# Patient Record
Sex: FEMALE
[De-identification: names, ages, dates, MRNs, and addresses within clinical notes are randomized per-mention and may not be internally consistent; named-entity substitution may affect disease eponyms.]

---

## 2019-07-01 ENCOUNTER — APPOINTMENT (OUTPATIENT)
Dept: OBGYN | Facility: CLINIC | Age: 39
End: 2019-07-01
Payer: COMMERCIAL

## 2019-07-01 ENCOUNTER — RECORD ABSTRACTING (OUTPATIENT)
Age: 39
End: 2019-07-01

## 2019-07-01 VITALS — WEIGHT: 152 LBS | BODY MASS INDEX: 23.04 KG/M2 | HEIGHT: 68 IN

## 2019-07-01 DIAGNOSIS — Z92.89 PERSONAL HISTORY OF OTHER MEDICAL TREATMENT: ICD-10-CM

## 2019-07-01 DIAGNOSIS — M54.5 LOW BACK PAIN: ICD-10-CM

## 2019-07-01 DIAGNOSIS — Z87.898 PERSONAL HISTORY OF OTHER SPECIFIED CONDITIONS: ICD-10-CM

## 2019-07-01 DIAGNOSIS — N83.201 UNSPECIFIED OVARIAN CYST, RIGHT SIDE: ICD-10-CM

## 2019-07-01 DIAGNOSIS — Z87.440 PERSONAL HISTORY OF URINARY (TRACT) INFECTIONS: ICD-10-CM

## 2019-07-01 PROBLEM — Z00.00 ENCOUNTER FOR PREVENTIVE HEALTH EXAMINATION: Status: ACTIVE | Noted: 2019-07-01

## 2019-07-01 LAB
CYTOLOGY CVX/VAG DOC THIN PREP: NORMAL
GLUCOSE UR-MCNC: NORMAL
HGB UR QL STRIP.AUTO: NORMAL
LEUKOCYTE ESTERASE UR QL STRIP: NORMAL

## 2019-07-01 PROCEDURE — 99213 OFFICE O/P EST LOW 20 MIN: CPT

## 2019-07-01 PROCEDURE — 99395 PREV VISIT EST AGE 18-39: CPT | Mod: 25

## 2019-07-01 PROCEDURE — 81002 URINALYSIS NONAUTO W/O SCOPE: CPT

## 2019-07-01 RX ORDER — VALACYCLOVIR HYDROCHLORIDE 500 MG/1
500 TABLET, FILM COATED ORAL
Qty: 60 | Refills: 0 | Status: ACTIVE | COMMUNITY
Start: 2019-01-14

## 2019-07-01 RX ORDER — TOBRAMYCIN AND DEXAMETHASONE 3; 1 MG/ML; MG/ML
0.3-0.1 SUSPENSION/ DROPS OPHTHALMIC
Qty: 5 | Refills: 0 | Status: ACTIVE | COMMUNITY
Start: 2019-02-26

## 2019-07-01 RX ORDER — OFLOXACIN 3 MG/ML
0.3 SOLUTION/ DROPS OPHTHALMIC
Qty: 5 | Refills: 0 | Status: ACTIVE | COMMUNITY
Start: 2019-03-01

## 2019-07-01 RX ORDER — MOXIFLOXACIN OPHTHALMIC 5 MG/ML
0.5 SOLUTION/ DROPS OPHTHALMIC
Qty: 3 | Refills: 0 | Status: ACTIVE | COMMUNITY
Start: 2019-02-25

## 2019-07-01 RX ORDER — SPIRONOLACTONE 100 MG/1
100 TABLET ORAL
Qty: 30 | Refills: 0 | Status: ACTIVE | COMMUNITY
Start: 2019-04-08

## 2019-07-01 RX ORDER — PREDNISOLONE ACETATE 10 MG/ML
1 SUSPENSION/ DROPS OPHTHALMIC
Qty: 15 | Refills: 0 | Status: ACTIVE | COMMUNITY
Start: 2019-03-04

## 2019-07-01 NOTE — PHYSICAL EXAM
[Awake] : awake [Alert] : alert [Soft] : soft [Oriented x3] : oriented to person, place, and time [Normal] : cervix [No Bleeding] : there was no active vaginal bleeding [Uterine Adnexae] : were not tender and not enlarged [Acute Distress] : no acute distress [Mass] : no breast mass [Nipple Discharge] : no nipple discharge [Axillary LAD] : no axillary lymphadenopathy [Tender] : non tender [Enlarged ___ wks] : enlarged [unfilled] ~Uweeks

## 2019-07-12 LAB
CYTOLOGY CVX/VAG DOC THIN PREP: ABNORMAL
HPV DNA HIGH RISK: NORMAL
HPV DNA LOW RISK: NORMAL
HPV I/H RISK 1 DNA CVX QL PROBE+SIG AMP: NORMAL
HPV I/H RISK 1 DNA CVX QL PROBE+SIG AMP: NORMAL
HPV LOW RISK DNA CVX QL PROBE+SIG AMP: NORMAL
HPV LOW RISK DNA CVX QL PROBE+SIG AMP: NORMAL

## 2019-08-26 ENCOUNTER — APPOINTMENT (OUTPATIENT)
Dept: OBGYN | Facility: CLINIC | Age: 39
End: 2019-08-26
Payer: COMMERCIAL

## 2019-08-26 VITALS
HEIGHT: 68 IN | DIASTOLIC BLOOD PRESSURE: 78 MMHG | BODY MASS INDEX: 23.04 KG/M2 | WEIGHT: 152 LBS | SYSTOLIC BLOOD PRESSURE: 106 MMHG

## 2019-08-26 DIAGNOSIS — R39.15 URGENCY OF URINATION: ICD-10-CM

## 2019-08-26 DIAGNOSIS — R31.29 OTHER MICROSCOPIC HEMATURIA: ICD-10-CM

## 2019-08-26 DIAGNOSIS — Z87.440 PERSONAL HISTORY OF URINARY (TRACT) INFECTIONS: ICD-10-CM

## 2019-08-26 PROCEDURE — 99213 OFFICE O/P EST LOW 20 MIN: CPT | Mod: 25

## 2019-08-26 PROCEDURE — 81002 URINALYSIS NONAUTO W/O SCOPE: CPT

## 2019-08-26 PROCEDURE — 76830 TRANSVAGINAL US NON-OB: CPT

## 2019-08-26 RX ORDER — PHENAZOPYRIDINE HYDROCHLORIDE 200 MG/1
200 TABLET ORAL 3 TIMES DAILY
Qty: 6 | Refills: 0 | Status: COMPLETED | COMMUNITY
Start: 2019-08-26 | End: 2019-08-28

## 2019-08-26 RX ORDER — LEVOFLOXACIN 500 MG/1
500 TABLET, FILM COATED ORAL
Qty: 7 | Refills: 0 | Status: COMPLETED | COMMUNITY
Start: 2019-02-28

## 2019-08-26 RX ORDER — CIPROFLOXACIN HYDROCHLORIDE 500 MG/1
500 TABLET, FILM COATED ORAL TWICE DAILY
Qty: 14 | Refills: 0 | Status: COMPLETED | COMMUNITY
Start: 2019-08-26 | End: 2019-09-02

## 2019-08-26 NOTE — PROCEDURE
[Pelvic Pain] : pelvic pain [Transvaginal Ultrasound] : transvaginal ultrasound [Present] : uterus present [L: ___ cm] : L: [unfilled] cm [Anteverted] : anteverted [W: ___cm] : W: [unfilled] cm [FreeTextEntry7] : 2.5 x 4.5 cm [FreeTextEntry8] : 2.5 x 4.0 cm [FreeTextEntry4] : Transvaginal ultrasound appears within normal limits

## 2019-08-27 LAB
GLUCOSE UR-MCNC: NORMAL
HGB UR QL STRIP.AUTO: 250
LEUKOCYTE ESTERASE UR QL STRIP: NORMAL
PROT UR STRIP-MCNC: NORMAL

## 2019-10-03 ENCOUNTER — APPOINTMENT (OUTPATIENT)
Dept: OBGYN | Facility: CLINIC | Age: 39
End: 2019-10-03
Payer: COMMERCIAL

## 2019-10-03 VITALS — SYSTOLIC BLOOD PRESSURE: 108 MMHG | DIASTOLIC BLOOD PRESSURE: 72 MMHG | BODY MASS INDEX: 22.81 KG/M2 | WEIGHT: 150 LBS

## 2019-10-03 DIAGNOSIS — N91.2 AMENORRHEA, UNSPECIFIED: ICD-10-CM

## 2019-10-03 DIAGNOSIS — N92.6 IRREGULAR MENSTRUATION, UNSPECIFIED: ICD-10-CM

## 2019-10-03 DIAGNOSIS — R79.89 OTHER SPECIFIED ABNORMAL FINDINGS OF BLOOD CHEMISTRY: ICD-10-CM

## 2019-10-03 LAB — HCG UR QL: POSITIVE

## 2019-10-03 PROCEDURE — 81025 URINE PREGNANCY TEST: CPT

## 2019-10-03 PROCEDURE — 76830 TRANSVAGINAL US NON-OB: CPT

## 2019-10-03 PROCEDURE — 99214 OFFICE O/P EST MOD 30 MIN: CPT | Mod: 25

## 2019-10-03 NOTE — PROCEDURE
[Amenorrhea] : Amenorrhea [Threatened Miscarriage] : threatened miscarriage [Transvaginal Ultrasound] : transvaginal ultrasound [Anteverted] : anteverted [Present] : uterus present [W: ___cm] : W: [unfilled] cm [L: ___ cm] : L: [unfilled] cm [FreeTextEntry8] : 2.5 x 3.8 cm [FreeTextEntry7] : 2.9 x 4.5 cm [FreeTextEntry4] : Intrauterine pregnancy noted crown-rump length equal to 6 weeks 5 days with fetal heart motion.

## 2019-10-03 NOTE — PHYSICAL EXAM
[Normal] : cervix [Enlarged ___ wks] : enlarged [unfilled] ~Uweeks [No Bleeding] : there was no active vaginal bleeding [Uterine Adnexae] : were not tender and not enlarged

## 2020-12-21 PROBLEM — Z87.440 HISTORY OF URINARY TRACT INFECTION: Status: RESOLVED | Noted: 2019-08-26 | Resolved: 2020-12-21

## 2021-08-05 ENCOUNTER — APPOINTMENT (OUTPATIENT)
Dept: OBGYN | Facility: CLINIC | Age: 41
End: 2021-08-05
Payer: COMMERCIAL

## 2021-08-05 VITALS — BODY MASS INDEX: 22.05 KG/M2 | WEIGHT: 145 LBS | DIASTOLIC BLOOD PRESSURE: 70 MMHG | SYSTOLIC BLOOD PRESSURE: 114 MMHG

## 2021-08-05 DIAGNOSIS — R10.31 RIGHT LOWER QUADRANT PAIN: ICD-10-CM

## 2021-08-05 DIAGNOSIS — Z01.411 ENCOUNTER FOR GYNECOLOGICAL EXAMINATION (GENERAL) (ROUTINE) WITH ABNORMAL FINDINGS: ICD-10-CM

## 2021-08-05 PROCEDURE — 99213 OFFICE O/P EST LOW 20 MIN: CPT | Mod: 25

## 2021-08-05 PROCEDURE — 99395 PREV VISIT EST AGE 18-39: CPT

## 2021-08-05 PROCEDURE — 81002 URINALYSIS NONAUTO W/O SCOPE: CPT

## 2021-08-05 PROCEDURE — 76830 TRANSVAGINAL US NON-OB: CPT

## 2021-08-05 NOTE — HISTORY OF PRESENT ILLNESS
[FreeTextEntry1] : Patient is 40 years old para 4-0-0-4 last menstrual period July 21, 2021\par Patient states that she noted right lower quadrant pain approximately 2 days ago, she is status post evaluation by her primary care physician who ordered a pelvic and abdominal CT scan.  Urine dip is noted to be within normal limits.  Patient states that her menstrual periods are regular every month lasting approximately 5 days.

## 2021-08-05 NOTE — PHYSICAL EXAM
[Appropriately responsive] : appropriately responsive [Alert] : alert [No Acute Distress] : no acute distress [No Lymphadenopathy] : no lymphadenopathy [Regular Rate Rhythm] : regular rate rhythm [No Murmurs] : no murmurs [Clear to Auscultation B/L] : clear to auscultation bilaterally [Soft] : soft [Non-tender] : non-tender [Non-distended] : non-distended [No HSM] : No HSM [No Lesions] : no lesions [No Mass] : no mass [Oriented x3] : oriented x3 [Examination Of The Breasts] : a normal appearance [] : implants [No Masses] : no breast masses were palpable [Labia Majora] : normal [Labia Minora] : normal [Normal] : normal [Enlarged ___ wks] : enlarged [unfilled] ~Uweeks [Uterine Adnexae] : normal [Tenderness] : tender [Anteversion] : anteverted [Mass ___ cm] : a [unfilled] ~Ucm uterine mass was palpated

## 2021-08-05 NOTE — DISCUSSION/SUMMARY
[FreeTextEntry1] : Pap done\par Self breast exam stressed\par Prescribed bilateral screening mammogram and bilateral breast ultrasound\par Prescribed pelvic and renal ultrasound\par Keep menstrual calendar\par Follow-up yearly or as needed

## 2021-08-05 NOTE — PROCEDURE
[Cervical Pap Smear] : cervical Pap smear [Liquid Base] : liquid base [Tolerated Well] : the patient tolerated the procedure well [No Complications] : there were no complications [Pelvic Pain] : pelvic pain [Fibroid Uterus] : fibroid uterus [Transvaginal Ultrasound] : transvaginal ultrasound [Anteverted] : anteverted [L: ___ cm] : L: [unfilled] cm [H: ___ cm] : H: [unfilled] cm [FreeTextEntry5] : Uterine fibroid noted measuring 3.5 x 3.8 cm [FreeTextEntry7] : 2.6 x 4.0 cm [FreeTextEntry8] : 2.3 x 3.7 cm

## 2021-09-27 LAB
GLUCOSE UR-MCNC: NORMAL
HGB UR QL STRIP.AUTO: NORMAL
LEUKOCYTE ESTERASE UR QL STRIP: NORMAL
PROT UR STRIP-MCNC: NORMAL

## 2022-04-05 ENCOUNTER — APPOINTMENT (OUTPATIENT)
Dept: OBGYN | Facility: CLINIC | Age: 42
End: 2022-04-05
Payer: COMMERCIAL

## 2022-04-05 VITALS — DIASTOLIC BLOOD PRESSURE: 62 MMHG | BODY MASS INDEX: 22.96 KG/M2 | WEIGHT: 151 LBS | SYSTOLIC BLOOD PRESSURE: 106 MMHG

## 2022-04-05 DIAGNOSIS — N76.2 ACUTE VULVITIS: ICD-10-CM

## 2022-04-05 DIAGNOSIS — B37.3 CANDIDIASIS OF VULVA AND VAGINA: ICD-10-CM

## 2022-04-05 PROCEDURE — 99396 PREV VISIT EST AGE 40-64: CPT

## 2022-04-05 RX ORDER — FLUCONAZOLE 150 MG/1
150 TABLET ORAL
Qty: 1 | Refills: 0 | Status: COMPLETED | COMMUNITY
Start: 2022-04-05 | End: 2022-04-05

## 2022-04-05 RX ORDER — CLOTRIMAZOLE AND BETAMETHASONE DIPROPIONATE 10; .5 MG/G; MG/G
1-0.05 CREAM TOPICAL TWICE DAILY
Qty: 1 | Refills: 0 | Status: ACTIVE | COMMUNITY
Start: 2022-04-05 | End: 1900-01-01

## 2022-04-05 RX ORDER — FLUCONAZOLE 150 MG/1
150 TABLET ORAL
Qty: 1 | Refills: 0 | Status: ACTIVE | COMMUNITY
Start: 2022-04-05 | End: 1900-01-01

## 2022-04-05 RX ORDER — CLOTRIMAZOLE AND BETAMETHASONE DIPROPIONATE 10; .5 MG/G; MG/G
1-0.05 CREAM TOPICAL TWICE DAILY
Qty: 1 | Refills: 0 | Status: COMPLETED | COMMUNITY
Start: 2022-04-05 | End: 2022-04-05

## 2022-04-05 NOTE — HISTORY OF PRESENT ILLNESS
[FreeTextEntry1] : Patient is 41 years old para 4-0-0-4 last menstrual period March 12, 2022.\par She complains of vaginal discharge with vulvar irritation and inflammation.  Patient has a history of uterine fibroids and left ovarian cyst.

## 2022-04-05 NOTE — DISCUSSION/SUMMARY
[FreeTextEntry1] : B VV test done\par Prescribed Lotrisone and Diflucan\par Prescribed follow-up pelvic ultrasound\par Keep menstrual calendar\par Follow-up yearly or as needed

## 2022-04-05 NOTE — PHYSICAL EXAM
[Appropriately responsive] : appropriately responsive [Alert] : alert [No Acute Distress] : no acute distress [No Lymphadenopathy] : no lymphadenopathy [Regular Rate Rhythm] : regular rate rhythm [No Murmurs] : no murmurs [Clear to Auscultation B/L] : clear to auscultation bilaterally [Soft] : soft [Non-tender] : non-tender [Non-distended] : non-distended [No HSM] : No HSM [No Lesions] : no lesions [No Mass] : no mass [Oriented x3] : oriented x3 [Vulvitis] : vulvitis [Labia Majora] : normal [Labia Minora] : normal [Discharge] : a  ~M vaginal discharge was present [Normal] : normal [Tenderness] : tender [Enlarged ___ wks] : enlarged [unfilled] ~Uweeks [Anteversion] : anteverted [Mass ___ cm] : a [unfilled] ~Ucm uterine mass was palpated [Uterine Adnexae] : normal

## 2022-04-07 RX ORDER — METRONIDAZOLE 500 MG/1
500 TABLET ORAL TWICE DAILY
Qty: 14 | Refills: 0 | Status: ACTIVE | COMMUNITY
Start: 2022-04-07 | End: 1900-01-01

## 2022-04-19 ENCOUNTER — APPOINTMENT (OUTPATIENT)
Dept: GYNECOLOGIC ONCOLOGY | Facility: CLINIC | Age: 42
End: 2022-04-19
Payer: COMMERCIAL

## 2022-04-19 VITALS
DIASTOLIC BLOOD PRESSURE: 80 MMHG | HEIGHT: 68 IN | BODY MASS INDEX: 22.73 KG/M2 | WEIGHT: 150 LBS | TEMPERATURE: 97.2 F | SYSTOLIC BLOOD PRESSURE: 143 MMHG

## 2022-04-19 DIAGNOSIS — D25.9 LEIOMYOMA OF UTERUS, UNSPECIFIED: ICD-10-CM

## 2022-04-19 DIAGNOSIS — D21.9 BENIGN NEOPLASM OF CONNECTIVE AND OTHER SOFT TISSUE, UNSPECIFIED: ICD-10-CM

## 2022-04-19 DIAGNOSIS — D25.1 INTRAMURAL LEIOMYOMA OF UTERUS: ICD-10-CM

## 2022-04-19 DIAGNOSIS — Z97.5 PRESENCE OF (INTRAUTERINE) CONTRACEPTIVE DEVICE: ICD-10-CM

## 2022-04-19 DIAGNOSIS — R10.9 UNSPECIFIED ABDOMINAL PAIN: ICD-10-CM

## 2022-04-19 DIAGNOSIS — N85.2 HYPERTROPHY OF UTERUS: ICD-10-CM

## 2022-04-19 PROCEDURE — 99204 OFFICE O/P NEW MOD 45 MIN: CPT

## 2022-04-19 NOTE — PAST MEDICAL HISTORY
[Menstruating] : The patient is menstruating [Menarche Age ____] : age at menarche was [unfilled] [Menopause Age____] : age at menopause was [unfilled] [Definite ___ (Date)] : the last menstrual period was [unfilled]

## 2022-04-20 NOTE — HISTORY OF PRESENT ILLNESS
[FreeTextEntry1] : 41 year old patient of Dr. Suazo, (NSVDx 4) last menstrual period 2022.\par She complains of vaginal discharge with vulvar irritation and inflammation. Patient has a history of uterine fibroids and a left ovarian cyst.  She complains of increasing pressure and pain secondary to enlarging fibroids. She is here for consultation with respect to surgical management and is requesting a TLH.

## 2022-04-25 ENCOUNTER — NON-APPOINTMENT (OUTPATIENT)
Age: 42
End: 2022-04-25

## 2022-04-29 ENCOUNTER — NON-APPOINTMENT (OUTPATIENT)
Age: 42
End: 2022-04-29

## 2022-05-02 ENCOUNTER — OUTPATIENT (OUTPATIENT)
Dept: OUTPATIENT SERVICES | Facility: HOSPITAL | Age: 42
LOS: 1 days | Discharge: HOME | End: 2022-05-02
Payer: COMMERCIAL

## 2022-05-02 VITALS
TEMPERATURE: 99 F | OXYGEN SATURATION: 100 % | HEIGHT: 68 IN | WEIGHT: 153.66 LBS | SYSTOLIC BLOOD PRESSURE: 109 MMHG | RESPIRATION RATE: 16 BRPM | HEART RATE: 83 BPM | DIASTOLIC BLOOD PRESSURE: 65 MMHG

## 2022-05-02 DIAGNOSIS — D25.9 LEIOMYOMA OF UTERUS, UNSPECIFIED: ICD-10-CM

## 2022-05-02 DIAGNOSIS — Z01.818 ENCOUNTER FOR OTHER PREPROCEDURAL EXAMINATION: ICD-10-CM

## 2022-05-02 DIAGNOSIS — Z98.82 BREAST IMPLANT STATUS: Chronic | ICD-10-CM

## 2022-05-02 DIAGNOSIS — Z98.890 OTHER SPECIFIED POSTPROCEDURAL STATES: Chronic | ICD-10-CM

## 2022-05-02 LAB
ALBUMIN SERPL ELPH-MCNC: 4.6 G/DL — SIGNIFICANT CHANGE UP (ref 3.5–5.2)
ALP SERPL-CCNC: 47 U/L — SIGNIFICANT CHANGE UP (ref 30–115)
ALT FLD-CCNC: 11 U/L — SIGNIFICANT CHANGE UP (ref 0–41)
ANION GAP SERPL CALC-SCNC: 10 MMOL/L — SIGNIFICANT CHANGE UP (ref 7–14)
APTT BLD: 29.7 SEC — SIGNIFICANT CHANGE UP (ref 27–39.2)
AST SERPL-CCNC: 19 U/L — SIGNIFICANT CHANGE UP (ref 0–41)
BASOPHILS # BLD AUTO: 0.03 K/UL — SIGNIFICANT CHANGE UP (ref 0–0.2)
BASOPHILS NFR BLD AUTO: 0.5 % — SIGNIFICANT CHANGE UP (ref 0–1)
BILIRUB SERPL-MCNC: 0.2 MG/DL — SIGNIFICANT CHANGE UP (ref 0.2–1.2)
BLD GP AB SCN SERPL QL: SIGNIFICANT CHANGE UP
BUN SERPL-MCNC: 9 MG/DL — LOW (ref 10–20)
CALCIUM SERPL-MCNC: 8.8 MG/DL — SIGNIFICANT CHANGE UP (ref 8.5–10.1)
CHLORIDE SERPL-SCNC: 106 MMOL/L — SIGNIFICANT CHANGE UP (ref 98–110)
CO2 SERPL-SCNC: 24 MMOL/L — SIGNIFICANT CHANGE UP (ref 17–32)
CREAT SERPL-MCNC: 0.6 MG/DL — LOW (ref 0.7–1.5)
EGFR: 116 ML/MIN/1.73M2 — SIGNIFICANT CHANGE UP
EOSINOPHIL # BLD AUTO: 0.05 K/UL — SIGNIFICANT CHANGE UP (ref 0–0.7)
EOSINOPHIL NFR BLD AUTO: 0.9 % — SIGNIFICANT CHANGE UP (ref 0–8)
GLUCOSE SERPL-MCNC: 70 MG/DL — SIGNIFICANT CHANGE UP (ref 70–99)
HCT VFR BLD CALC: 34.2 % — LOW (ref 37–47)
HGB BLD-MCNC: 11.2 G/DL — LOW (ref 12–16)
IMM GRANULOCYTES NFR BLD AUTO: 0.2 % — SIGNIFICANT CHANGE UP (ref 0.1–0.3)
INR BLD: 1.03 RATIO — SIGNIFICANT CHANGE UP (ref 0.65–1.3)
LYMPHOCYTES # BLD AUTO: 1.64 K/UL — SIGNIFICANT CHANGE UP (ref 1.2–3.4)
LYMPHOCYTES # BLD AUTO: 29.4 % — SIGNIFICANT CHANGE UP (ref 20.5–51.1)
MCHC RBC-ENTMCNC: 28 PG — SIGNIFICANT CHANGE UP (ref 27–31)
MCHC RBC-ENTMCNC: 32.7 G/DL — SIGNIFICANT CHANGE UP (ref 32–37)
MCV RBC AUTO: 85.5 FL — SIGNIFICANT CHANGE UP (ref 81–99)
MONOCYTES # BLD AUTO: 0.47 K/UL — SIGNIFICANT CHANGE UP (ref 0.1–0.6)
MONOCYTES NFR BLD AUTO: 8.4 % — SIGNIFICANT CHANGE UP (ref 1.7–9.3)
NEUTROPHILS # BLD AUTO: 3.38 K/UL — SIGNIFICANT CHANGE UP (ref 1.4–6.5)
NEUTROPHILS NFR BLD AUTO: 60.6 % — SIGNIFICANT CHANGE UP (ref 42.2–75.2)
NRBC # BLD: 0 /100 WBCS — SIGNIFICANT CHANGE UP (ref 0–0)
PLATELET # BLD AUTO: 257 K/UL — SIGNIFICANT CHANGE UP (ref 130–400)
POTASSIUM SERPL-MCNC: 4.2 MMOL/L — SIGNIFICANT CHANGE UP (ref 3.5–5)
POTASSIUM SERPL-SCNC: 4.2 MMOL/L — SIGNIFICANT CHANGE UP (ref 3.5–5)
PROT SERPL-MCNC: 7 G/DL — SIGNIFICANT CHANGE UP (ref 6–8)
PROTHROM AB SERPL-ACNC: 11.8 SEC — SIGNIFICANT CHANGE UP (ref 9.95–12.87)
RBC # BLD: 4 M/UL — LOW (ref 4.2–5.4)
RBC # FLD: 13.3 % — SIGNIFICANT CHANGE UP (ref 11.5–14.5)
SODIUM SERPL-SCNC: 140 MMOL/L — SIGNIFICANT CHANGE UP (ref 135–146)
WBC # BLD: 5.58 K/UL — SIGNIFICANT CHANGE UP (ref 4.8–10.8)
WBC # FLD AUTO: 5.58 K/UL — SIGNIFICANT CHANGE UP (ref 4.8–10.8)

## 2022-05-02 PROCEDURE — 93010 ELECTROCARDIOGRAM REPORT: CPT

## 2022-05-02 NOTE — H&P PST ADULT - REASON FOR ADMISSION
42 y/o female presents to PAST in preparation for total laparoscopic hysterectomy, bilateral salpingectomy in [facility] under GA with Dr. Aguila on 5/16/22

## 2022-05-02 NOTE — H&P PST ADULT - HISTORY OF PRESENT ILLNESS
40 y/o female presents to PAST in preparation for total laparoscopic hysterectomy, bilateral salpingectomy in [facility] under GA with Dr. Aguila on 5/16/22     Pt with hx of uterine fibroids and left ovarian cyst for the past 4 yrs. Pt had a recent US that showed an increase in size of the uterine fibroids and has increased pressure due to the fibroids. Pt states that she feels constantly bloated. Pt wishes for above procedure due to symptoms.    PATIENT CURRENTLY DENIES CHEST PAIN  SHORTNESS OF BREATH  PALPITATIONS,  DYSURIA, OR UPPER RESPIRATORY INFECTION IN PAST 2 WEEKS  EXERCISE  TOLERANCE  10 FLIGHT OF STAIRS  WITHOUT SHORTNESS OF BREATH  pt denies any covid s/s, covid (+) 12/21  pt advised self quarantine till day of procedure  Patient verbalized understanding of instructions and was given the opportunity to ask questions and have them answered.  As per patient, this is their complete medical and surgical history, including medications both prescribed or over the counter.  written and verbal instructions with teach back on chlorhexidine shampoo provided,  pt verbalized understanding with returned demonstration    Anesthesia Alert  NO--Difficult Airway  NO--History of neck surgery or radiation  NO--Limited ROM of neck  NO--History of Malignant hyperthermia  NO--Personal or family history of Pseudocholinesterase deficiency.  NO--Prior Anesthesia Complication  NO--Latex Allergy  NO--Loose teeth  NO--History of Rheumatoid Arthritis  NO--KATERINA  NO--Bleeding risk  NO--Other_____    N85.2, D25.9/90918    ^N85.2, D25.9/58573

## 2022-05-02 NOTE — H&P PST ADULT - NSANTHOSAYNRD_GEN_A_CORE
No. KATERINA screening performed.  STOP BANG Legend: 0-2 = LOW Risk; 3-4 = INTERMEDIATE Risk; 5-8 = HIGH Risk

## 2022-05-02 NOTE — H&P PST ADULT - ATTENDING COMMENTS
41 year old patient of Dr. Suazo,  (NSVDx 4) last menstrual period 2022.  Patient has a history of uterine fibroids and a left ovarian cyst. She complains of increasing pressure and pain secondary to enlarging fibroids. She is interested in definitive surgical management and is requesting a TLH/BS.     Assessment  Abdominal pain (789.00) (R10.9)  Enlarged uterus (621.2) (N85.2)  Fibroids (215.9) (D21.9)  Fibroids, intramural (218.1) (D25.1)  Fibroids, submucosal (218.0) (D25.0)  History of IUD contraception (V45.51) (Z97.5)    Plan  TLH/BS for Symptomatic fibroid uterus (Pain Pressure secondary to increasing size)  Options for surgical management discussed. Procedures offered patient included laparoscopic hysterectomy with bilateral salpingectomies along with possible bilateral oophorectomies. She is requesting to preserve her ovaries if normal and is opting for  a TLH/BS.    The risk benefits and alternative to the recommended treatments were discussed. She was informed about potential complications of surgery including but not limited to bowel, bladder, and ureteral injuries. Infectious morbidity, along with bleeding and thromboembolic events were also discussed.  She showed clear understanding, was given the opportunity to ask questions and is amenable to the above surgical treatment.

## 2022-05-13 NOTE — ASU PATIENT PROFILE, ADULT - TEACHING/LEARNING LEARNING PREFERENCES
MEDICARE WELLNESS VISIT NOTE      HISTORY OF PRESENT ILLNESS:   Taylor Torres presents for her Welcome to Medicare Medicare Wellness Visit.   She has complaints or concerns which include .      Patient states she fell in November and broke her right wrist. Due to the pain in her wrist she later noticed there was pain in her left hip from the fall. The patient did not seek treatment and complains of pain in her left hip, limited range of motion, and difficulty walking.     Patient Care Team:  Hazel Castañeda MD as PCP - General (Family Practice)        Patient Active Problem List   Diagnosis   • Chronic left hip pain         Past Medical History:   Diagnosis Date   • Broken bones    • Herpes          History reviewed. No pertinent surgical history.      Social History     Tobacco Use   • Smoking status: Never Smoker   • Smokeless tobacco: Never Used   Substance Use Topics   • Alcohol use: Yes     Alcohol/week: 5.0 standard drinks     Types: 5 Glasses of wine per week   • Drug use: Not Currently     Drug use:    Drug Use:    Not Current*    Family History   Problem Relation Age of Onset   • Cancer Mother         Bone and Throat   • Cancer Father         Breast   • Hypertension Father    • Cancer Sister         Colon   • Psychiatric Sister    • Cancer Brother         Lung       No current outpatient medications on file.     No current facility-administered medications for this visit.         The following items on the Medicare Health Risk Assessment were found to be positive  1.) Do you have an Advance directive, living will, or power of  for health care document that contains your wishes for end of life care?:  No     2.) Would you like additional information on advance directives?:  Yes     6 b.) How many servings of High Fiber / Whole Grain Foods to you have each day ( 1 serving = 1 cup cold cereal, 1/2 cup cooked cereal, 1 slice bread):  1 per day     11c.) Teeth or Denture Problems:  Often     11d.) Bodily  pain:  Always     11e.) Tiredness or Fatigue:  Often     11j.) Driven/Ridden in a car without wearing your seatbelt:  Sometimes     15.) How confident are you that you can control and manage most of your health problems?:  Somewhat confident       ROS:  General: Fatigue. No fever or chills.   Respiratory: Cough. No shortness of breath or difficulty breathing.   Genitourinary: Nighttime urination. No dysuria or hematuria.   Musculoskeletal: Decreased range of motion, joint pain, muscle aches and pains.   Psychiatric: Change in sleep pattern. No suicidal ideation.     Vision and Hearing screens:    Visual Acuity Screening    Right eye Left eye Both eyes   Without correction:      With correction: 20/20 20/20 20/20       Advance Directive:   The patient has the following documents:  No Advance Directives on file. Patient offered documents.    Cognitive Assessment: no evidence of cognitive dysfunction by direct observation     Timed Up and Go Test (TUG)  [x]  7-10 seconds - Normal  []  10-19 seconds -  Fairly Mobile  []  20-29 seconds - Variable Mobility  []  Greater than 30 seconds - Functionally Dependent    Taylor has no hearing difficulty.      Whisper Test Pass  Fail Hearing Aids   Right Ear [x]  []  []    Left Ear [x]  []  []      Positive dentures. Planning to schedule appointments with dentist and eye doctor.       COGNITIVE FUNCTION:  Taylor is alert and oriented times 3. She can remember 3/3 objects at 1 minute.    PHYSICAL EXAM:    Visit Vitals  /84   Pulse 73   Resp 16   Ht 5' 3\" (1.6 m)   Wt 60.4 kg   SpO2 94%   BMI 23.58 kg/m²       General Appearance:  Well developed, well nourished, well appearing female in no acute distress. Easily on and off the table and talking in full sentences.    Neck: Supple, trachea midline No palpable thyromegaly. No carotid bruit or jugular venous distention.  Respiratory:  Lungs are clear to auscultation bilaterally throughout all lobes. No wheezing, rhonchi, or  crackles. No stridor. No increased work of breathing, retractions or accessory muscle use.   Cardiovascular:  Normal rate and rhythm. Normal S1 and S2. No S3-S4, murmur, gallop, click or rub.   Neurologic:  Alert & oriented x 3, Sensation intact. Normal motor function, no focal deficits noted.   Psychiatric:  Affect normal. Speech is normal and non-pressured. Behavior appropriate.  MSK- pain with hip extension and abduction, no tenderness at hip joint or lateral part of hip, no knee tenderness and normal knee ROM. No swelling of knee joint, patient walking with limp.        Recent PHQ 2/9 Score    PHQ 2:  Date Adult PHQ 2 Score   3/12/2020 0       DEPRESSION ASSESSMENT/PLAN:  Depression screening is negative no further plan needed.     Body mass index is 23.58 kg/m².    BMI ASSESSMENT/PLAN:  Patient BMI is within normal range.      Needed Screening/Treatment:   Cardiovascular screening - Lipids , Diabetes screening , Annual mammogram , Colorectal cancer screening , Bone density, Hepatitis C and Immunizations reviewed and patient needs: Influenza and Prevnar 13  Needed follow up:    Left hip pain- ordered Xray to r/o bone injury or arthritis. If normal Xray recommend PT.    See orders.   See Patient Instructions section.   Return in about 1 year (around 3/12/2021) for Medicare Wellness Visit.   verbal instruction/written material

## 2022-05-16 ENCOUNTER — TRANSCRIPTION ENCOUNTER (OUTPATIENT)
Age: 42
End: 2022-05-16

## 2022-05-16 ENCOUNTER — APPOINTMENT (OUTPATIENT)
Dept: GYNECOLOGIC ONCOLOGY | Facility: HOSPITAL | Age: 42
End: 2022-05-16
Payer: COMMERCIAL

## 2022-05-16 ENCOUNTER — RESULT REVIEW (OUTPATIENT)
Age: 42
End: 2022-05-16

## 2022-05-16 ENCOUNTER — OUTPATIENT (OUTPATIENT)
Dept: OUTPATIENT SERVICES | Facility: HOSPITAL | Age: 42
LOS: 1 days | Discharge: HOME | End: 2022-05-16
Payer: COMMERCIAL

## 2022-05-16 VITALS — SYSTOLIC BLOOD PRESSURE: 110 MMHG | HEART RATE: 71 BPM | DIASTOLIC BLOOD PRESSURE: 75 MMHG | OXYGEN SATURATION: 100 %

## 2022-05-16 VITALS
RESPIRATION RATE: 17 BRPM | HEIGHT: 68 IN | TEMPERATURE: 97 F | WEIGHT: 149.91 LBS | HEART RATE: 88 BPM | SYSTOLIC BLOOD PRESSURE: 114 MMHG | DIASTOLIC BLOOD PRESSURE: 71 MMHG | OXYGEN SATURATION: 99 %

## 2022-05-16 DIAGNOSIS — Z98.890 OTHER SPECIFIED POSTPROCEDURAL STATES: Chronic | ICD-10-CM

## 2022-05-16 DIAGNOSIS — Z98.82 BREAST IMPLANT STATUS: Chronic | ICD-10-CM

## 2022-05-16 LAB — BLD GP AB SCN SERPL QL: SIGNIFICANT CHANGE UP

## 2022-05-16 PROCEDURE — 58825 TRANSPOSITION OVARY(S): CPT | Mod: 59

## 2022-05-16 PROCEDURE — 88307 TISSUE EXAM BY PATHOLOGIST: CPT | Mod: 26

## 2022-05-16 PROCEDURE — 58573 TLH W/T/O UTERUS OVER 250 G: CPT

## 2022-05-16 PROCEDURE — 88302 TISSUE EXAM BY PATHOLOGIST: CPT | Mod: 26

## 2022-05-16 RX ORDER — IBUPROFEN 200 MG
1 TABLET ORAL
Qty: 56 | Refills: 0
Start: 2022-05-16 | End: 2022-05-29

## 2022-05-16 RX ORDER — HYDROMORPHONE HYDROCHLORIDE 2 MG/ML
1 INJECTION INTRAMUSCULAR; INTRAVENOUS; SUBCUTANEOUS
Refills: 0 | Status: DISCONTINUED | OUTPATIENT
Start: 2022-05-16 | End: 2022-05-16

## 2022-05-16 RX ORDER — SODIUM CHLORIDE 9 MG/ML
1000 INJECTION, SOLUTION INTRAVENOUS
Refills: 0 | Status: DISCONTINUED | OUTPATIENT
Start: 2022-05-16 | End: 2022-05-16

## 2022-05-16 RX ORDER — MEPERIDINE HYDROCHLORIDE 50 MG/ML
12.5 INJECTION INTRAMUSCULAR; INTRAVENOUS; SUBCUTANEOUS
Refills: 0 | Status: DISCONTINUED | OUTPATIENT
Start: 2022-05-16 | End: 2022-05-16

## 2022-05-16 RX ORDER — ACETAMINOPHEN 500 MG
2 TABLET ORAL
Qty: 112 | Refills: 0
Start: 2022-05-16 | End: 2022-05-29

## 2022-05-16 RX ORDER — HYDROMORPHONE HYDROCHLORIDE 2 MG/ML
0.5 INJECTION INTRAMUSCULAR; INTRAVENOUS; SUBCUTANEOUS
Refills: 0 | Status: DISCONTINUED | OUTPATIENT
Start: 2022-05-16 | End: 2022-05-16

## 2022-05-16 RX ORDER — SIMETHICONE 80 MG/1
1 TABLET, CHEWABLE ORAL
Qty: 56 | Refills: 0
Start: 2022-05-16 | End: 2022-05-29

## 2022-05-16 RX ORDER — ONDANSETRON 8 MG/1
4 TABLET, FILM COATED ORAL ONCE
Refills: 0 | Status: DISCONTINUED | OUTPATIENT
Start: 2022-05-16 | End: 2022-05-16

## 2022-05-16 RX ORDER — OXYCODONE HYDROCHLORIDE 5 MG/1
1 TABLET ORAL
Qty: 5 | Refills: 0
Start: 2022-05-16

## 2022-05-16 RX ORDER — OXYCODONE AND ACETAMINOPHEN 5; 325 MG/1; MG/1
2 TABLET ORAL ONCE
Refills: 0 | Status: DISCONTINUED | OUTPATIENT
Start: 2022-05-16 | End: 2022-05-16

## 2022-05-16 RX ORDER — DOCUSATE SODIUM 100 MG
1 CAPSULE ORAL
Qty: 28 | Refills: 0
Start: 2022-05-16 | End: 2022-05-29

## 2022-05-16 RX ADMIN — HYDROMORPHONE HYDROCHLORIDE 0.5 MILLIGRAM(S): 2 INJECTION INTRAMUSCULAR; INTRAVENOUS; SUBCUTANEOUS at 11:00

## 2022-05-16 NOTE — ASU PREOP CHECKLIST - NS PREOP CHK CHLOROHEX WASH
Mildly to Moderately Impaired: difficulty hearing in some environments or speaker may need to increase volume or speak distinctly N/A

## 2022-05-16 NOTE — CHART NOTE - NSCHARTNOTEFT_GEN_A_CORE
PACU ANESTHESIA ADMISSION NOTE      Procedure: exam under anesthesia, total laparoscopic hysterectomy, bilateral salpingectomy, lysis of adhesions and bilateral ovarian suspension   Post op diagnosis:  fibroid uterus     ____  Intubated  TV:______       Rate: ______      FiO2: ______    _x___  Patent Airway    _x___  Full return of protective reflexes    _x___  Full recovery from anesthesia / back to baseline status    Vitals:  T(F): 97.3   HR: 88  BP: 116/64  RR: 20  SpO2: 98%    Mental Status:  _x___ Awake   _x____ Alert   _____ Drowsy   _____ Sedated    Nausea/Vomiting:  _x___  NO       ______Yes,   See Post - Op Orders         Pain Scale (0-10):  __0___    Treatment: _x___ None    ____ See Post - Op/PCA Orders    Post - Operative Fluids:   __x__ Oral   ____ See Post - Op Orders    Plan: Discharge:   _x___Home       _____Floor     _____Critical Care    _____  Other:_________________    Comments:  No anesthesia issues or complications noted.  Discharge when criteria met.

## 2022-05-16 NOTE — BRIEF OPERATIVE NOTE - NSICDXBRIEFPROCEDURE_GEN_ALL_CORE_FT
PROCEDURES:  Hysterectomy, total, laparoscopic, for uterus greater than 250 grams 16-May-2022 10:43:31  Madhuri Coley  Bilateral salpingectomy 16-May-2022 10:43:41  Madhuri Coley

## 2022-05-16 NOTE — ASU DISCHARGE PLAN (ADULT/PEDIATRIC) - ASU DC SPECIAL INSTRUCTIONSFT
Nothing in the vagina for 6-8 weeks (no sex, no tampons, no douching). Avoid tub baths, you may shower.    If you have a fever of 100.4F or greater, severe vaginal bleeding, or severe abdominal pain, call your Ob/Gyn or come to the emergency department immediately.    Walking and stairs are OK  NO heavy lifting or straining  The glue on your incisions is waterproof, it should peel off in 7-10 days. If it does not you can use some vaseline and carefully take off  You may wear the abdominal binder as much as you like    Medications have been sent to your pharmacy: Motrin/Tylenol (pain), oxycodone (severe pain), simethicone (gas), colace (constipation)

## 2022-05-16 NOTE — BRIEF OPERATIVE NOTE - OPERATION/FINDINGS
enlarge fibroid uterus, approximately 14wk size  normal tubes and ovaries bilaterally, normal appendix  no additional abdominal pathology  no cervical or vaginal lesions enlarge fibroid uterus, approximately 14wk size  normal tubes and ovaries bilaterally, normal appendix  no additional abdominal pathology  no cervical or vaginal lesions  frozen = benign

## 2022-05-16 NOTE — ASU DISCHARGE PLAN (ADULT/PEDIATRIC) - NS MD DC FALL RISK RISK
For information on Fall & Injury Prevention, visit: [email or web address] OR  [email or web address] OR  https://www.cdc.gov/steadi/patient.html

## 2022-05-18 LAB — SURGICAL PATHOLOGY STUDY: SIGNIFICANT CHANGE UP

## 2022-05-20 ENCOUNTER — NON-APPOINTMENT (OUTPATIENT)
Age: 42
End: 2022-05-20

## 2022-05-20 DIAGNOSIS — N72 INFLAMMATORY DISEASE OF CERVIX UTERI: ICD-10-CM

## 2022-05-20 DIAGNOSIS — N83.8 OTHER NONINFLAMMATORY DISORDERS OF OVARY, FALLOPIAN TUBE AND BROAD LIGAMENT: ICD-10-CM

## 2022-05-20 DIAGNOSIS — D25.9 LEIOMYOMA OF UTERUS, UNSPECIFIED: ICD-10-CM

## 2022-05-20 DIAGNOSIS — N88.8 OTHER SPECIFIED NONINFLAMMATORY DISORDERS OF CERVIX UTERI: ICD-10-CM

## 2022-05-31 ENCOUNTER — APPOINTMENT (OUTPATIENT)
Dept: GYNECOLOGIC ONCOLOGY | Facility: CLINIC | Age: 42
End: 2022-05-31
Payer: COMMERCIAL

## 2022-05-31 DIAGNOSIS — R33.9 RETENTION OF URINE, UNSPECIFIED: ICD-10-CM

## 2022-05-31 DIAGNOSIS — Z87.898 PERSONAL HISTORY OF OTHER SPECIFIED CONDITIONS: ICD-10-CM

## 2022-05-31 PROCEDURE — 99024 POSTOP FOLLOW-UP VISIT: CPT

## 2022-05-31 NOTE — DISCUSSION/SUMMARY
[Clean] : was clean [Dry] : was dry [Erythema] : was not erythematous [Ecchymosis] : was not ecchymotic [Seroma] : had no seroma [None] : had no drainage [Normal Skin] : normal appearance [Doing Well] : is doing well [Excellent Pain Control] : has excellent pain control

## 2022-05-31 NOTE — ASSESSMENT
[FreeTextEntry1] : 41 year old patient of Dr. Suazo, (NSVDx 4), with a  history of uterine fibroids and a left ovarian cyst. She complained of increasing pressure and pain secondary to enlarging fibroids. She is s/p TLH/BS 22\par \par \par   [facility] Accession Number : [id number]\par Patient:   LUCERO MARTINEZ\par \par \par Accession:                             [id number]\par \par Collected Date/Time:                   2022 08:40 EDT\par Received Date/Time:                    2022 09:29 EDT\par \par Surgical Pathology Report - Auth (Verified)\par \par Specimen(s) Submitted\par 1  Left fallopian tube\par 2  Right fallopian tube\par 3  Uterus and cervix\par \par Final Diagnosis\par 1.  Left Fallopian tube:\par -  Paratubal cyst.\par \par \par 2. Right Fallopian tube:\par -  Paratubal cyst.\par \par 3.  Uterus and cervix:\par Uterus:\par - Proliferative endometrium and leiomyomas.\par \par Cervix:\par -  Multiple nabothian cysts, microglandular hyperplasia and chronic\par cervicitis.\par Verified by: María Valiente M.D.\par (Electronic Signature)\par Reported on: 22 12:26 EDT, [facility],\par [address] [place] [address]\par Phone: [phone]   Fax: [phone]\par  \par

## 2022-06-21 ENCOUNTER — NON-APPOINTMENT (OUTPATIENT)
Age: 42
End: 2022-06-21

## 2022-07-26 ENCOUNTER — APPOINTMENT (OUTPATIENT)
Dept: GYNECOLOGIC ONCOLOGY | Facility: CLINIC | Age: 42
End: 2022-07-26

## 2022-07-26 VITALS
BODY MASS INDEX: 22.73 KG/M2 | SYSTOLIC BLOOD PRESSURE: 123 MMHG | DIASTOLIC BLOOD PRESSURE: 88 MMHG | WEIGHT: 150 LBS | HEIGHT: 68 IN

## 2022-07-26 PROCEDURE — 99024 POSTOP FOLLOW-UP VISIT: CPT

## 2022-07-26 NOTE — DISCUSSION/SUMMARY
[Erythema] : was not erythematous [Ecchymosis] : was not ecchymotic [Seroma] : had no seroma [Firm] : soft [Tender] : nontender [Abnormal Bowel Sounds] : normal bowel sounds [Rebound] : no rebound tenderness [Guarding] : no guarding [Incisional Hernia] : no incisional hernia [Mass] : no palpable mass [External Genitalia Abnormal] : normal external genitalia [Vaginal Exam Abnormal] : normal vaginal exam

## 2022-07-26 NOTE — ASSESSMENT
[FreeTextEntry1] : 41 year old patient of Dr. Suazo, (NSVDx 4), with a  history of uterine fibroids and a left ovarian cyst. She complained of increasing pressure and pain secondary to enlarging fibroids. She is s/p TLH/BS 22. She presents today for her 2nd post operative visit, and appears to be recovering well.\par \par \par   [facility] Accession Number : [id number]\par Patient:   LUCERO MARTINEZ\par \par \par Accession:                             [id number]\par \par Collected Date/Time:                   2022 08:40 EDT\par Received Date/Time:                    2022 09:29 EDT\par \par Surgical Pathology Report - Auth (Verified)\par \par Specimen(s) Submitted\par 1  Left fallopian tube\par 2  Right fallopian tube\par 3  Uterus and cervix\par \par Final Diagnosis\par 1.  Left Fallopian tube:\par -  Paratubal cyst.\par \par \par 2. Right Fallopian tube:\par -  Paratubal cyst.\par \par 3.  Uterus and cervix:\par Uterus:\par - Proliferative endometrium and leiomyomas.\par \par Cervix:\par -  Multiple nabothian cysts, microglandular hyperplasia and chronic\par cervicitis.\par Verified by: María Valiente M.D.\par (Electronic Signature)\par Reported on: 22 12:26 EDT, [facility],\par [address] [place] [id number]\par Phone: [phone]   Fax: [phone]\par  \par

## 2022-08-02 ENCOUNTER — APPOINTMENT (OUTPATIENT)
Dept: GYNECOLOGIC ONCOLOGY | Facility: CLINIC | Age: 42
End: 2022-08-02

## 2022-08-02 DIAGNOSIS — Z86.018 PERSONAL HISTORY OF OTHER BENIGN NEOPLASM: ICD-10-CM

## 2022-08-02 DIAGNOSIS — N76.0 ACUTE VAGINITIS: ICD-10-CM

## 2022-08-02 DIAGNOSIS — B96.89 ACUTE VAGINITIS: ICD-10-CM

## 2022-08-02 DIAGNOSIS — Z90.710 ACQUIRED ABSENCE OF BOTH CERVIX AND UTERUS: ICD-10-CM

## 2022-08-02 PROCEDURE — 99024 POSTOP FOLLOW-UP VISIT: CPT

## 2022-08-02 RX ORDER — METRONIDAZOLE 500 MG/1
500 TABLET ORAL
Qty: 28 | Refills: 0 | Status: ACTIVE | COMMUNITY
Start: 2022-08-02 | End: 1900-01-01

## 2022-08-09 NOTE — DISCUSSION/SUMMARY
[Clean] : was clean [Dry] : was dry [Intact] : was intact [Erythema] : was not erythematous [Ecchymosis] : was not ecchymotic [Seroma] : had no seroma [None] : had no drainage [Normal Skin] : normal appearance [Firm] : soft [Tender] : nontender [Abnormal Bowel Sounds] : normal bowel sounds [Rebound] : no rebound tenderness [Guarding] : no guarding [Incisional Hernia] : no incisional hernia [Mass] : no palpable mass [External Genitalia Abnormal] : normal external genitalia [Vaginal Exam Abnormal] : normal vaginal exam

## 2022-08-09 NOTE — ASSESSMENT
[FreeTextEntry1] : 41 year old patient of Dr. Suazo, (NSVDx 4), with a history of uterine fibroids and a left ovarian cyst. She complained of increasing pressure and pain secondary to enlarging fibroids. She is s/p TLH/BS 22. She presents today for her 2nd post operative visit, and c/o pain post intercourse with PCB. Vaginal cuff cellulitis suspected and she was started on Flagyl.\par

## 2022-08-16 ENCOUNTER — APPOINTMENT (OUTPATIENT)
Dept: GYNECOLOGIC ONCOLOGY | Facility: CLINIC | Age: 42
End: 2022-08-16

## 2022-08-16 VITALS — BODY MASS INDEX: 22.73 KG/M2 | HEIGHT: 68 IN | WEIGHT: 150 LBS

## 2022-08-16 PROCEDURE — 99024 POSTOP FOLLOW-UP VISIT: CPT

## 2022-08-16 NOTE — ASSESSMENT
[FreeTextEntry1] : 41 year old patient of Dr. Suazo, (NSVDx 4), with a history of uterine fibroids and a left ovarian cyst. She complained of increasing pressure and pain secondary to enlarging fibroids. She is s/p TLH/BS 22. She presents today for her 2nd post operative visit, and c/o pain post intercourse with PCB. Vaginal cuff cellulitis along with Granulation tissue suspected and she was started on Flagyl.\par She returns for post operative visit with discharge resolved and a small residual granulation tissue in left fornix for which silver nitrate was utilized.

## 2022-08-16 NOTE — DISCUSSION/SUMMARY
[Erythema] : was not erythematous [Ecchymosis] : was not ecchymotic [Seroma] : had no seroma [Firm] : soft [Tender] : nontender [Abnormal Bowel Sounds] : normal bowel sounds [Rebound] : no rebound tenderness [Guarding] : no guarding [Incisional Hernia] : no incisional hernia [Mass] : no palpable mass [External Genitalia Abnormal] : normal external genitalia [de-identified] : minimal granulation tissue cauterized with silver nitrate.

## 2022-09-08 ENCOUNTER — APPOINTMENT (OUTPATIENT)
Dept: OBGYN | Facility: CLINIC | Age: 42
End: 2022-09-08

## 2022-09-30 ENCOUNTER — APPOINTMENT (OUTPATIENT)
Dept: OBGYN | Facility: CLINIC | Age: 42
End: 2022-09-30

## 2022-09-30 VITALS
WEIGHT: 157 LBS | BODY MASS INDEX: 23.79 KG/M2 | SYSTOLIC BLOOD PRESSURE: 118 MMHG | HEIGHT: 68 IN | DIASTOLIC BLOOD PRESSURE: 76 MMHG

## 2022-09-30 PROCEDURE — 99213 OFFICE O/P EST LOW 20 MIN: CPT | Mod: 25

## 2022-09-30 PROCEDURE — 17250 CHEM CAUT OF GRANLTJ TISSUE: CPT

## 2022-09-30 NOTE — DISCUSSION/SUMMARY
Reason for visit:  Abdominal pain/ Flank pain  Left sided  stone      SUBJECTIVE:  Eloina Benoit is an 65 year old female who was seen on the request of  Dr. Judi Murray  for consultation regarding Left  flank pain.  Characteristics of the pain are as follows:    Chronicity: Onset sept 1st had left flank pain which resolved by  Sept 5th.   Location: left flank  with radiation to left pelvic area  Quality: aching  Quantity: 6/10 in intensity  Aggravating factors: none   Alleviating factors: movement  Associated symptoms: nausea  Family history:     Patient drinks water, juice, and some soda a day. 64 0z.   Patient drinks no coffee.     PAST MEDICAL HISTORY:  Past Medical History:   Diagnosis Date   • Allergic rhinitis, cause unspecified    • Chronic rhinitis    • HTN (hypertension)    • Obesity (BMI 30-39.9) 7/18/2017   • Other and unspecified hyperlipidemia     declined meds in past       PAST SURGICAL HISTORY:  Past Surgical History:   Procedure Laterality Date   • Nasal scope,bx/rmv polyp/debrid  1980s    nasal polypectomy   • Vag hyst,rmv tube/ovary  1999    TVH w BSO  no cancer; endometriosis       MEDICATIONS:  Current Outpatient Prescriptions   Medication Sig Dispense Refill   • losartan (COZAAR) 50 MG tablet Take 1 tablet by mouth 2 times daily. 180 tablet 1   • losartan-hydrochlorothiazide (HYZAAR) 50-12.5 MG per tablet Take 1 tablet by mouth daily. 30 tablet 2   • montelukast (SINGULAIR) 10 MG tablet Take 1 tablet by mouth every evening. 90 tablet 3   • Turmeric 450 MG Cap Take 450 mg by mouth 2 times daily.     • loratadine (CLARITIN) 10 MG tablet Take 10 mg by mouth daily.     • Ascorbic Acid (VITAMIN C) 1000 MG tablet Take 1,000 mg by mouth daily.     • Multiple Vitamins-Minerals (CENTRUM ADULTS) Tab Take 1 tablet by mouth daily.     • calcium carbonate-vitamin D (CALTRATE+D) 600-400 MG-UNIT per tablet Take 1 tablet by mouth daily.     • aspirin 81 MG tablet Take 81 mg by mouth daily.       No current  [FreeTextEntry1] : \par Prescribed yearly bilateral screening mammogram\par Follow-up 3 months or as needed facility-administered medications for this visit.      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  Review of systems documented in nurse's notes are reviewed.      REVIEW OF SYSTEMS:see ros    PHYSICAL EXAM:  Visit Vitals  /80   Pulse 70   Resp 14     GENERAL:  appears stated age, is in no apparent distress and is well developed and well nourished. Presents to appointment alone.   HEENT:  head is normocephalic, face is symmetric, bilateral eyes with no gross abnormality, bilateral ears with no gross abnormality, nose with no gross abnormality and mouth with pink mucosa  NECK:  neck is supple, no thyromegaly and no carotid bruits noted  LUNG::  lungs are clear to auscultation with normal inspiratory/expiratory sounds, no rales, no rhonchi and no wheezes  HEART::  normal rate and rhythm, S1 and S2 normal and no murmurs  ABDOMEN:abdomen is soft, nontender, without masses and without guarding  GENITOURINARY: Deferred  RECTAL: no palpable internal or external hemorrhoids and normal sphincter control  NEUROLOGIC:  The patient moves all 4 extremities with good ROM, strength, and tone. Light touch and proprioception are within normal limits.  EXTREMITIES:  supple &  symmetrical with full ROM and no clubbing, cyanosis or edema    URINALYSIS:  not performed    IMAGING REVIEWED:  9/6/18 CT abdomen pelvis wo contrast     IMPRESSION:      1. 7.5 mm stone at the renal pelvic junction on the left.  2. Several nonobstructing intrarenal calculi lower pole and midpole left  kidney. These are described above.  3. No evidence of right-sided renal calculi or obstruction.  4. Mild degenerative changes lower lumbar spine.  5. Fat-containing umbilical hernia.  6. Diverticulosis of the sigmoid and descending colon, no diverticulitis  seen       IMPRESSION:  Left Kidney Stone, 8 mm  Left Ureteral Stone, 7 mm    PLAN:  Patient would like to proceed with ureteroscopy.  Today in clinic consent was signed, scripts given, and preoperative instructions reviewed.    Methods of management of stone based on size and location discussed after personally reviewing the images with the patient.  We reviewed the treatment of kidney stone  versus ureteral stone. Small stones (<n 5 mm) are expected to pass without need for intervention and treatment is based upon symptoms.    We reviewed intervention for stones using ureteroscopy with laser lithotripsy versus shockwave lithotripsy. The advantages of each of those treatment option was discussed. Treatment of large stones generally require use of stents.  The risks of intervention by ureteroscopy are bleeding, infection, ureteral injury, stricture sepsis and stent related pain.   The risks of Shockwave lithotripsy are Bleeding, infection, lack of complete fragmentation of stone, occasional risk of renal rupture and hemorrhage and the risk of retreatment.   If stone fails to fragment, clear and/or obstructs ureter, additional treatment may be needed.  If ureteroscopy is performed the need for stenting was discussed. The possible but low risk of ureteral injury and stricture as a complication was discussed. Stent-related discomfort and management and time for stent  to remain indwelling was discussed.     Taking into consideration patient's age, health, comorbidities,  body habitus, stone size, stone location, episodes of colic ER visit and taking into consideration probability of spontaneous passage my best recommendation in this case is ureteroscopy.     Discussed stone prevention.  Push fluids and keep well-hydrated at all times.  Strategies to help reduce stone disease are no-added salt diet and increasing urine volumes to 2 liters per day on a routine basis through intake of increased oral fluid intake. Avoid alonzo and sodas.   Recommend lemon juice in the form of lemonade as part of fluid intake to help increase urinary citrate.  For recurrent stone formers metabolic stone evaluation needed.  Urocit K for chemolysis is an option for uric acid stones.  No more than 1 or 2 cups of coffee or 1 cup of tea a day.  Avoid excessive consumption of green leafy vegetables.    I have discussed and recommended the following surgical procedure(s):       Surgery scheduling requirements include:    Procedure: Ureteroscopy, with laser fragmentation Left  - [id number]    Facility: [facility]     Admission Type: Outpatient Surgery    Time Needed: 60 min    Anesthesia: General    Surgical Assist: no    Co-Surgeon: no    Special equipment: Yes, holmium laser      Pre-Op H&P: Schedule pre-op with PCP    Pre-Op labs: CBC, BMP and EKG    Schedule post-op appt: TBD      Patient Prep Instructions: No bowel prep needed    Additional Comment: - No ASA for 7-10 days before surgery  - No Coumadin for 5 days before surgery.  - No Plavix for 7 days before surgery.     Copy of this consultation report sent to . Judi Murray.    Zohreh TOLLIVER APNP, am acting as a scribe for Dr. Fransisco Mann.  Dr. Fransisco Mann was present during the entirety of this visit and performed the patient's history, physical examination, impression and plan/medical decision making.    Fransisco TOLLIVER MD saw the patient during the  entire office visit, interviewed and examined the patient and personally performed patient's clinical impressions and treatment plan while ROXANA Larson  functioned as my scribe.

## 2022-09-30 NOTE — HISTORY OF PRESENT ILLNESS
[FreeTextEntry1] : Patient is 41 years old para 4-0-0-4 last menstrual period May 5, 2022\par She is status post total laparoscopic hysterectomy bilateral salpingectomy on May 16, 2022.\par Patient was noted to have vaginal cuff cellulitis in August 2, 2022 and was treated with p.o. Flagyl\par Granulation tissue was also treated with silver nitrate at that time.\par Patient states that she noted vaginal discomfort after sexual relations.

## 2022-09-30 NOTE — PHYSICAL EXAM
[Appropriately responsive] : appropriately responsive [Alert] : alert [No Acute Distress] : no acute distress [No Lymphadenopathy] : no lymphadenopathy [Regular Rate Rhythm] : regular rate rhythm [No Murmurs] : no murmurs [Clear to Auscultation B/L] : clear to auscultation bilaterally [Soft] : soft [Non-tender] : non-tender [Non-distended] : non-distended [No HSM] : No HSM [No Lesions] : no lesions [No Mass] : no mass [Oriented x3] : oriented x3 [Examination Of The Breasts] : a normal appearance [] : implants [No Masses] : no breast masses were palpable [Labia Majora] : normal [Labia Minora] : normal [Normal] : normal [Absent] : absent [Tenderness] : nontender [Enlarged ___ wks] : not enlarged [Anteversion] : anteverted [Mass ___ cm] : no uterine mass was palpated [Uterine Adnexae] : non-palpable [FreeTextEntry4] : 2 mm granulation tissue noted and treated with silver nitrate

## 2022-11-29 ENCOUNTER — APPOINTMENT (OUTPATIENT)
Dept: OBGYN | Facility: CLINIC | Age: 42
End: 2022-11-29

## 2022-11-29 VITALS
BODY MASS INDEX: 23.49 KG/M2 | HEIGHT: 68 IN | WEIGHT: 155 LBS | SYSTOLIC BLOOD PRESSURE: 114 MMHG | DIASTOLIC BLOOD PRESSURE: 78 MMHG

## 2022-11-29 DIAGNOSIS — N93.0 POSTCOITAL AND CONTACT BLEEDING: ICD-10-CM

## 2022-11-29 DIAGNOSIS — Z01.411 ENCOUNTER FOR GYNECOLOGICAL EXAMINATION (GENERAL) (ROUTINE) WITH ABNORMAL FINDINGS: ICD-10-CM

## 2022-11-29 DIAGNOSIS — N94.10 UNSPECIFIED DYSPAREUNIA: ICD-10-CM

## 2022-11-29 PROCEDURE — 99213 OFFICE O/P EST LOW 20 MIN: CPT | Mod: 25

## 2022-11-29 PROCEDURE — 17250 CHEM CAUT OF GRANLTJ TISSUE: CPT

## 2022-11-29 PROCEDURE — 99396 PREV VISIT EST AGE 40-64: CPT

## 2022-11-29 NOTE — PHYSICAL EXAM
[Appropriately responsive] : appropriately responsive [Alert] : alert [No Acute Distress] : no acute distress [No Lymphadenopathy] : no lymphadenopathy [Regular Rate Rhythm] : regular rate rhythm [No Murmurs] : no murmurs [Clear to Auscultation B/L] : clear to auscultation bilaterally [Soft] : soft [Non-tender] : non-tender [Non-distended] : non-distended [No HSM] : No HSM [No Lesions] : no lesions [No Mass] : no mass [Oriented x3] : oriented x3 [Labia Majora] : normal [Labia Minora] : normal [Normal] : normal [Absent] : absent [Tenderness] : nontender [Enlarged ___ wks] : not enlarged [Anteversion] : anteverted [Mass ___ cm] : no uterine mass was palpated [Uterine Adnexae] : non-palpable [FreeTextEntry4] : Minimal granulation tissue noted

## 2022-11-29 NOTE — DISCUSSION/SUMMARY
[FreeTextEntry1] : Pap done \par Self breast exam stressed \par Prescribed pelvic ultrasound \par advised follow-up in 2 weeks\par

## 2022-11-29 NOTE — HISTORY OF PRESENT ILLNESS
[FreeTextEntry1] : Patient is 42 years old para 4-0-0-4 last menstrual period May 5, 2022\par She is status post total laparoscopic hysterectomy bilateral salpingectomy on May 16, 2022\par Patient states that she noted painful sexual relations, postcoital spotting.

## 2022-12-15 ENCOUNTER — APPOINTMENT (OUTPATIENT)
Dept: OBGYN | Facility: CLINIC | Age: 42
End: 2022-12-15

## 2022-12-15 VITALS — HEIGHT: 68 IN | SYSTOLIC BLOOD PRESSURE: 110 MMHG | DIASTOLIC BLOOD PRESSURE: 68 MMHG

## 2022-12-15 DIAGNOSIS — N72 INFLAMMATORY DISEASE OF CERVIX UTERI: ICD-10-CM

## 2022-12-15 DIAGNOSIS — B97.7 INFLAMMATORY DISEASE OF CERVIX UTERI: ICD-10-CM

## 2022-12-15 PROCEDURE — 57454 BX/CURETT OF CERVIX W/SCOPE: CPT

## 2022-12-15 RX ORDER — ESTRADIOL 0.1 MG/G
0.1 CREAM VAGINAL
Qty: 1 | Refills: 1 | Status: ACTIVE | COMMUNITY
Start: 2022-12-15 | End: 1900-01-01

## 2022-12-15 NOTE — PROCEDURE
[Colposcopy] : Colposcopy  [LGSIL] : LGSIL [Time out performed] : Pre-procedure time out performed.  Patient's name, date of birth and procedure confirmed. [Consent Obtained] : Consent obtained [Risks] : risks [Benefits] : benefits [Alternatives] : alternatives [Patient] : patient [Infection] : infection [Bleeding] : bleeding [Allergic Reaction] : allergic reaction [HPV High Risk] : HPV high risk [No Premedication] : no premedication [Colposcopy Adequate] : colposcopy adequate [Pap Performed] : pap not performed [SCI Fully Visualized] : SCI fully visualized [ECC Performed] : ECC performed [No Abnormalities] : no abnormalities [Biopsy] : biopsy taken [Hemostasis Obtained] : Hemostasis obtained [Tolerated Well] : the patient tolerated the procedure well [de-identified] : Acetowhite changes noted, vaginal cuff biopsies performed @12 and 9:00 [de-identified] : Cervix is surgically absent

## 2022-12-22 ENCOUNTER — APPOINTMENT (OUTPATIENT)
Dept: OBGYN | Facility: CLINIC | Age: 42
End: 2022-12-22

## 2022-12-22 VITALS — BODY MASS INDEX: 22.73 KG/M2 | HEIGHT: 68 IN | WEIGHT: 150 LBS

## 2022-12-22 DIAGNOSIS — R87.612 LOW GRADE SQUAMOUS INTRAEPITHELIAL LESION ON CYTOLOGIC SMEAR OF CERVIX (LGSIL): ICD-10-CM

## 2022-12-22 DIAGNOSIS — N95.2 POSTMENOPAUSAL ATROPHIC VAGINITIS: ICD-10-CM

## 2022-12-22 DIAGNOSIS — N89.8 OTHER SPECIFIED NONINFLAMMATORY DISORDERS OF VAGINA: ICD-10-CM

## 2022-12-22 PROCEDURE — 99213 OFFICE O/P EST LOW 20 MIN: CPT | Mod: 25

## 2022-12-22 PROCEDURE — 17250 CHEM CAUT OF GRANLTJ TISSUE: CPT

## 2022-12-22 NOTE — HISTORY OF PRESENT ILLNESS
[FreeTextEntry1] : Patient is 42 years old para 4-0-0-4 last menstrual period May 5, 2022.\par She is status post total laparoscopic hysterectomy bilateral salpingectomy on May 16, 2022.\par She noted painful sexual relations and postcoital spotting.  Pap performed on November 29, 2022 noted low-grade squamous intraepithelial lesion (LSIL), HPV high-risk detected.\par She underwent colposcopy on December 15, 2022 pathology noted 12:00 biopsy inflamed granulation tissue, 9:00 biopsy fragments of unremarkable squamous mucosa.

## 2022-12-22 NOTE — PHYSICAL EXAM
[Appropriately responsive] : appropriately responsive [Alert] : alert [No Acute Distress] : no acute distress [No Lymphadenopathy] : no lymphadenopathy [Regular Rate Rhythm] : regular rate rhythm [No Murmurs] : no murmurs [Clear to Auscultation B/L] : clear to auscultation bilaterally [Soft] : soft [Non-tender] : non-tender [Non-distended] : non-distended [No HSM] : No HSM [No Lesions] : no lesions [No Mass] : no mass [Oriented x3] : oriented x3 [Labia Majora] : normal [Labia Minora] : normal [Normal] : normal [Absent] : absent [Tenderness] : nontender [Enlarged ___ wks] : not enlarged [Anteversion] : anteverted [Mass ___ cm] : no uterine mass was palpated [Uterine Adnexae] : non-palpable [FreeTextEntry4] : Minimal granulation tissue noted cauterized with silver nitrate

## 2023-02-09 ENCOUNTER — APPOINTMENT (OUTPATIENT)
Dept: OBGYN | Facility: CLINIC | Age: 43
End: 2023-02-09
Payer: COMMERCIAL

## 2023-02-09 DIAGNOSIS — R10.2 PELVIC AND PERINEAL PAIN: ICD-10-CM

## 2023-02-09 DIAGNOSIS — B37.31 ACUTE CANDIDIASIS OF VULVA AND VAGINA: ICD-10-CM

## 2023-02-09 DIAGNOSIS — R82.998 OTHER ABNORMAL FINDINGS IN URINE: ICD-10-CM

## 2023-02-09 DIAGNOSIS — R30.0 DYSURIA: ICD-10-CM

## 2023-02-09 DIAGNOSIS — N39.0 URINARY TRACT INFECTION, SITE NOT SPECIFIED: ICD-10-CM

## 2023-02-09 PROCEDURE — 81002 URINALYSIS NONAUTO W/O SCOPE: CPT

## 2023-02-09 PROCEDURE — 99214 OFFICE O/P EST MOD 30 MIN: CPT

## 2023-02-09 RX ORDER — TERCONAZOLE 4 MG/G
0.4 CREAM VAGINAL
Qty: 1 | Refills: 1 | Status: ACTIVE | COMMUNITY
Start: 2023-02-09 | End: 1900-01-01

## 2023-02-09 RX ORDER — CLOTRIMAZOLE AND BETAMETHASONE DIPROPIONATE 10; .5 MG/G; MG/G
1-0.05 CREAM TOPICAL TWICE DAILY
Qty: 1 | Refills: 0 | Status: ACTIVE | COMMUNITY
Start: 2023-02-09 | End: 1900-01-01

## 2023-02-09 RX ORDER — CIPROFLOXACIN HYDROCHLORIDE 500 MG/1
500 TABLET, FILM COATED ORAL
Qty: 14 | Refills: 0 | Status: ACTIVE | COMMUNITY
Start: 2023-02-09 | End: 1900-01-01

## 2023-02-09 NOTE — DISCUSSION/SUMMARY
[FreeTextEntry1] : B VV test done\par Urine culture sent\par Prescribe Cipro 500 mg twice daily x7 days\par Prescribed terconazole 7/Lotrisone cream\par Follow-up as needed

## 2023-02-09 NOTE — HISTORY OF PRESENT ILLNESS
[FreeTextEntry1] : Patient is 42 years old para 4-0-0-4 last menstrual period May 5, 2022 at which time she underwent a total laparoscopic hysterectomy.  Presently she complains of urinary symptoms including pelvic pressure and burning on urination.  She also notes vaginal discharge and vulvar irritation/inflammation

## 2023-06-08 ENCOUNTER — APPOINTMENT (OUTPATIENT)
Dept: OBGYN | Facility: CLINIC | Age: 43
End: 2023-06-08

## 2023-09-07 ENCOUNTER — APPOINTMENT (OUTPATIENT)
Dept: OBGYN | Facility: CLINIC | Age: 43
End: 2023-09-07
Payer: COMMERCIAL

## 2023-09-07 VITALS
WEIGHT: 164 LBS | SYSTOLIC BLOOD PRESSURE: 116 MMHG | BODY MASS INDEX: 24.86 KG/M2 | HEIGHT: 68 IN | DIASTOLIC BLOOD PRESSURE: 70 MMHG

## 2023-09-07 DIAGNOSIS — N89.8 OTHER SPECIFIED NONINFLAMMATORY DISORDERS OF VAGINA: ICD-10-CM

## 2023-09-07 DIAGNOSIS — B37.31 ACUTE CANDIDIASIS OF VULVA AND VAGINA: ICD-10-CM

## 2023-09-07 DIAGNOSIS — N90.89 OTHER SPECIFIED NONINFLAMMATORY DISORDERS OF VULVA AND PERINEUM: ICD-10-CM

## 2023-09-07 DIAGNOSIS — L29.2 PRURITUS VULVAE: ICD-10-CM

## 2023-09-07 PROCEDURE — 99214 OFFICE O/P EST MOD 30 MIN: CPT

## 2023-09-07 RX ORDER — TERCONAZOLE 4 MG/G
0.4 CREAM VAGINAL
Qty: 1 | Refills: 1 | Status: ACTIVE | COMMUNITY
Start: 2023-09-07 | End: 1900-01-01

## 2023-09-07 RX ORDER — CLOTRIMAZOLE AND BETAMETHASONE DIPROPIONATE 10; .5 MG/G; MG/G
1-0.05 CREAM TOPICAL TWICE DAILY
Qty: 1 | Refills: 0 | Status: ACTIVE | COMMUNITY
Start: 2023-09-07 | End: 1900-01-01

## 2023-09-07 NOTE — HISTORY OF PRESENT ILLNESS
[FreeTextEntry1] : Patient is 42 years old para 4-0-0-4 last menstrual period May 5, 2022 at which time she had a total laparoscopic hysterectomy bilateral salpingectomy. Presently the patient complains of vaginal discharge with vulvar irritation/inflammation Patient states that she is scheduled for an abdominoplasty procedure

## 2023-09-07 NOTE — PHYSICAL EXAM
[Chaperone Present] : A chaperone was present in the examining room during all aspects of the physical examination [FreeTextEntry1] : Sue [Appropriately responsive] : appropriately responsive [Alert] : alert [No Acute Distress] : no acute distress [No Lymphadenopathy] : no lymphadenopathy [Regular Rate Rhythm] : regular rate rhythm [No Murmurs] : no murmurs [Clear to Auscultation B/L] : clear to auscultation bilaterally [Soft] : soft [Non-tender] : non-tender [Non-distended] : non-distended [No HSM] : No HSM [No Lesions] : no lesions [No Mass] : no mass [Oriented x3] : oriented x3 [Vulvitis] : vulvitis [Labia Majora] : normal [Labia Minora] : normal [Discharge] : a  ~M vaginal discharge was present [Normal] : normal [Uterine Adnexae] : normal

## 2023-12-01 ENCOUNTER — APPOINTMENT (OUTPATIENT)
Dept: OBGYN | Facility: CLINIC | Age: 43
End: 2023-12-01
Payer: COMMERCIAL

## 2023-12-01 VITALS
BODY MASS INDEX: 23.34 KG/M2 | WEIGHT: 154 LBS | SYSTOLIC BLOOD PRESSURE: 114 MMHG | DIASTOLIC BLOOD PRESSURE: 70 MMHG | HEIGHT: 68 IN

## 2023-12-01 DIAGNOSIS — Z01.419 ENCOUNTER FOR GYNECOLOGICAL EXAMINATION (GENERAL) (ROUTINE) W/OUT ABNORMAL FINDINGS: ICD-10-CM

## 2023-12-01 DIAGNOSIS — Z78.9 OTHER SPECIFIED HEALTH STATUS: ICD-10-CM

## 2023-12-01 PROCEDURE — 99396 PREV VISIT EST AGE 40-64: CPT
